# Patient Record
Sex: FEMALE | Race: AMERICAN INDIAN OR ALASKA NATIVE | NOT HISPANIC OR LATINO | URBAN - METROPOLITAN AREA
[De-identification: names, ages, dates, MRNs, and addresses within clinical notes are randomized per-mention and may not be internally consistent; named-entity substitution may affect disease eponyms.]

---

## 2021-09-04 ENCOUNTER — INPATIENT (INPATIENT)
Facility: HOSPITAL | Age: 21
LOS: 0 days | Discharge: HOME | End: 2021-09-05
Attending: SURGERY | Admitting: SURGERY
Payer: COMMERCIAL

## 2021-09-04 VITALS
RESPIRATION RATE: 17 BRPM | TEMPERATURE: 97 F | HEART RATE: 90 BPM | SYSTOLIC BLOOD PRESSURE: 125 MMHG | DIASTOLIC BLOOD PRESSURE: 64 MMHG | WEIGHT: 210.1 LBS | OXYGEN SATURATION: 98 % | HEIGHT: 61 IN

## 2021-09-04 PROCEDURE — 93010 ELECTROCARDIOGRAM REPORT: CPT

## 2021-09-04 PROCEDURE — 99285 EMERGENCY DEPT VISIT HI MDM: CPT

## 2021-09-04 RX ORDER — FAMOTIDINE 10 MG/ML
20 INJECTION INTRAVENOUS ONCE
Refills: 0 | Status: COMPLETED | OUTPATIENT
Start: 2021-09-04 | End: 2021-09-04

## 2021-09-04 RX ORDER — KETOROLAC TROMETHAMINE 30 MG/ML
30 SYRINGE (ML) INJECTION ONCE
Refills: 0 | Status: DISCONTINUED | OUTPATIENT
Start: 2021-09-04 | End: 2021-09-04

## 2021-09-04 RX ADMIN — Medication 30 MILLILITER(S): at 23:30

## 2021-09-04 RX ADMIN — Medication 30 MILLIGRAM(S): at 22:19

## 2021-09-04 RX ADMIN — FAMOTIDINE 20 MILLIGRAM(S): 10 INJECTION INTRAVENOUS at 23:27

## 2021-09-04 NOTE — ED ADULT NURSE NOTE - OBJECTIVE STATEMENT
Pt presented in ED c/o chest discomfort and abdominal pain x 1 day.  Denies n/v/D, fever, chill, SOB. No acute distress noted

## 2021-09-04 NOTE — ED ADULT TRIAGE NOTE - CHIEF COMPLAINT QUOTE
Patient presents to ED with complaints of chest pain starting last night associated with shortness of breath and abdominal pain starting today.

## 2021-09-05 VITALS
SYSTOLIC BLOOD PRESSURE: 116 MMHG | TEMPERATURE: 97 F | OXYGEN SATURATION: 99 % | DIASTOLIC BLOOD PRESSURE: 60 MMHG | RESPIRATION RATE: 18 BRPM | HEART RATE: 78 BPM

## 2021-09-05 LAB
ALBUMIN SERPL ELPH-MCNC: 4.4 G/DL — SIGNIFICANT CHANGE UP (ref 3.5–5.2)
ALBUMIN SERPL ELPH-MCNC: 4.4 G/DL — SIGNIFICANT CHANGE UP (ref 3.5–5.2)
ALP SERPL-CCNC: 183 U/L — HIGH (ref 30–115)
ALP SERPL-CCNC: 187 U/L — HIGH (ref 30–115)
ALT FLD-CCNC: 396 U/L — HIGH (ref 0–41)
ALT FLD-CCNC: 401 U/L — HIGH (ref 0–41)
ANION GAP SERPL CALC-SCNC: 10 MMOL/L — SIGNIFICANT CHANGE UP (ref 7–14)
AST SERPL-CCNC: 561 U/L — HIGH (ref 0–41)
AST SERPL-CCNC: 562 U/L — HIGH (ref 0–41)
BASOPHILS # BLD AUTO: 0.05 K/UL — SIGNIFICANT CHANGE UP (ref 0–0.2)
BASOPHILS NFR BLD AUTO: 0.7 % — SIGNIFICANT CHANGE UP (ref 0–1)
BILIRUB DIRECT SERPL-MCNC: 1.7 MG/DL — HIGH (ref 0–0.2)
BILIRUB INDIRECT FLD-MCNC: 0.5 MG/DL — SIGNIFICANT CHANGE UP (ref 0.2–1.2)
BILIRUB SERPL-MCNC: 2.2 MG/DL — HIGH (ref 0.2–1.2)
BILIRUB SERPL-MCNC: 2.3 MG/DL — HIGH (ref 0.2–1.2)
BUN SERPL-MCNC: 13 MG/DL — SIGNIFICANT CHANGE UP (ref 10–20)
CALCIUM SERPL-MCNC: 9.5 MG/DL — SIGNIFICANT CHANGE UP (ref 8.5–10.1)
CHLORIDE SERPL-SCNC: 102 MMOL/L — SIGNIFICANT CHANGE UP (ref 98–110)
CO2 SERPL-SCNC: 26 MMOL/L — SIGNIFICANT CHANGE UP (ref 17–32)
CREAT SERPL-MCNC: 0.7 MG/DL — SIGNIFICANT CHANGE UP (ref 0.7–1.5)
EOSINOPHIL # BLD AUTO: 0.19 K/UL — SIGNIFICANT CHANGE UP (ref 0–0.7)
EOSINOPHIL NFR BLD AUTO: 2.5 % — SIGNIFICANT CHANGE UP (ref 0–8)
GLUCOSE SERPL-MCNC: 105 MG/DL — HIGH (ref 70–99)
HCG SERPL QL: NEGATIVE — SIGNIFICANT CHANGE UP
HCT VFR BLD CALC: 41.4 % — SIGNIFICANT CHANGE UP (ref 37–47)
HGB BLD-MCNC: 13.4 G/DL — SIGNIFICANT CHANGE UP (ref 12–16)
IMM GRANULOCYTES NFR BLD AUTO: 0.3 % — SIGNIFICANT CHANGE UP (ref 0.1–0.3)
LIDOCAIN IGE QN: 56 U/L — SIGNIFICANT CHANGE UP (ref 7–60)
LYMPHOCYTES # BLD AUTO: 1.95 K/UL — SIGNIFICANT CHANGE UP (ref 1.2–3.4)
LYMPHOCYTES # BLD AUTO: 25.5 % — SIGNIFICANT CHANGE UP (ref 20.5–51.1)
MCHC RBC-ENTMCNC: 27.1 PG — SIGNIFICANT CHANGE UP (ref 27–31)
MCHC RBC-ENTMCNC: 32.4 G/DL — SIGNIFICANT CHANGE UP (ref 32–37)
MCV RBC AUTO: 83.8 FL — SIGNIFICANT CHANGE UP (ref 81–99)
MONOCYTES # BLD AUTO: 0.69 K/UL — HIGH (ref 0.1–0.6)
MONOCYTES NFR BLD AUTO: 9 % — SIGNIFICANT CHANGE UP (ref 1.7–9.3)
NEUTROPHILS # BLD AUTO: 4.76 K/UL — SIGNIFICANT CHANGE UP (ref 1.4–6.5)
NEUTROPHILS NFR BLD AUTO: 62 % — SIGNIFICANT CHANGE UP (ref 42.2–75.2)
NRBC # BLD: 0 /100 WBCS — SIGNIFICANT CHANGE UP (ref 0–0)
PLATELET # BLD AUTO: 431 K/UL — HIGH (ref 130–400)
POTASSIUM SERPL-MCNC: 4.5 MMOL/L — SIGNIFICANT CHANGE UP (ref 3.5–5)
POTASSIUM SERPL-SCNC: 4.5 MMOL/L — SIGNIFICANT CHANGE UP (ref 3.5–5)
PROT SERPL-MCNC: 7.8 G/DL — SIGNIFICANT CHANGE UP (ref 6–8)
PROT SERPL-MCNC: 7.9 G/DL — SIGNIFICANT CHANGE UP (ref 6–8)
RBC # BLD: 4.94 M/UL — SIGNIFICANT CHANGE UP (ref 4.2–5.4)
RBC # FLD: 13.4 % — SIGNIFICANT CHANGE UP (ref 11.5–14.5)
SARS-COV-2 RNA SPEC QL NAA+PROBE: SIGNIFICANT CHANGE UP
SODIUM SERPL-SCNC: 138 MMOL/L — SIGNIFICANT CHANGE UP (ref 135–146)
TROPONIN T SERPL-MCNC: <0.01 NG/ML — SIGNIFICANT CHANGE UP
WBC # BLD: 7.66 K/UL — SIGNIFICANT CHANGE UP (ref 4.8–10.8)
WBC # FLD AUTO: 7.66 K/UL — SIGNIFICANT CHANGE UP (ref 4.8–10.8)

## 2021-09-05 PROCEDURE — 71046 X-RAY EXAM CHEST 2 VIEWS: CPT | Mod: 26

## 2021-09-05 PROCEDURE — 99223 1ST HOSP IP/OBS HIGH 75: CPT

## 2021-09-05 PROCEDURE — 99222 1ST HOSP IP/OBS MODERATE 55: CPT

## 2021-09-05 PROCEDURE — 74181 MRI ABDOMEN W/O CONTRAST: CPT | Mod: 26

## 2021-09-05 PROCEDURE — 74177 CT ABD & PELVIS W/CONTRAST: CPT | Mod: 26,MA

## 2021-09-05 PROCEDURE — 76705 ECHO EXAM OF ABDOMEN: CPT | Mod: 26

## 2021-09-05 RX ORDER — OXYCODONE HYDROCHLORIDE 5 MG/1
5 TABLET ORAL EVERY 6 HOURS
Refills: 0 | Status: DISCONTINUED | OUTPATIENT
Start: 2021-09-05 | End: 2021-09-05

## 2021-09-05 RX ORDER — SODIUM CHLORIDE 9 MG/ML
1000 INJECTION, SOLUTION INTRAVENOUS
Refills: 0 | Status: DISCONTINUED | OUTPATIENT
Start: 2021-09-05 | End: 2021-09-05

## 2021-09-05 RX ORDER — ACETAMINOPHEN 500 MG
650 TABLET ORAL EVERY 6 HOURS
Refills: 0 | Status: DISCONTINUED | OUTPATIENT
Start: 2021-09-05 | End: 2021-09-05

## 2021-09-05 RX ORDER — ENOXAPARIN SODIUM 100 MG/ML
40 INJECTION SUBCUTANEOUS EVERY 24 HOURS
Refills: 0 | Status: DISCONTINUED | OUTPATIENT
Start: 2021-09-05 | End: 2021-09-05

## 2021-09-05 RX ORDER — IBUPROFEN 200 MG
400 TABLET ORAL EVERY 6 HOURS
Refills: 0 | Status: DISCONTINUED | OUTPATIENT
Start: 2021-09-05 | End: 2021-09-05

## 2021-09-05 RX ORDER — PANTOPRAZOLE SODIUM 20 MG/1
40 TABLET, DELAYED RELEASE ORAL
Refills: 0 | Status: DISCONTINUED | OUTPATIENT
Start: 2021-09-05 | End: 2021-09-05

## 2021-09-05 RX ORDER — IBUPROFEN 200 MG
1 TABLET ORAL
Qty: 0 | Refills: 0 | DISCHARGE
Start: 2021-09-05

## 2021-09-05 RX ORDER — CHLORHEXIDINE GLUCONATE 213 G/1000ML
1 SOLUTION TOPICAL
Refills: 0 | Status: DISCONTINUED | OUTPATIENT
Start: 2021-09-05 | End: 2021-09-05

## 2021-09-05 RX ORDER — ACETAMINOPHEN 500 MG
2 TABLET ORAL
Qty: 0 | Refills: 0 | DISCHARGE
Start: 2021-09-05

## 2021-09-05 RX ADMIN — PANTOPRAZOLE SODIUM 40 MILLIGRAM(S): 20 TABLET, DELAYED RELEASE ORAL at 06:47

## 2021-09-05 RX ADMIN — SODIUM CHLORIDE 125 MILLILITER(S): 9 INJECTION, SOLUTION INTRAVENOUS at 06:12

## 2021-09-05 NOTE — CONSULT NOTE ADULT - SUBJECTIVE AND OBJECTIVE BOX
Gastroenterology Consultation:    Patient is a 20y old  Female who presents with a chief complaint of r/o choledocholithiasis (05 Sep 2021 05:43)        Admitted on: 09-05-21      HPI:      This is a 20 year old female with no significant PMH/PSH who presented to the hospital with abdominal pain since Friday evening. States that pain is sharp, nonradiating, and worsened over the last day. Reports associated nausea and 1 episode of NBNB emesis. Denies similar previous episodes. Denies fever, chills, or changes in bowel movements or urination. (05 Sep 2021 05:43)        Prior EGD: none    Prior Colonoscopy: none      PAST MEDICAL & SURGICAL HISTORY:  No pertinent past medical history    No significant past surgical history          FAMILY HISTORY:      Social History:  Tobacco: denies  Alcohol: denies  Drugs: denies    Home Medications:        MEDICATIONS  (STANDING):  chlorhexidine 4% Liquid 1 Application(s) Topical <User Schedule>  enoxaparin Injectable 40 milliGRAM(s) SubCutaneous every 24 hours  lactated ringers. 1000 milliLiter(s) (125 mL/Hr) IV Continuous <Continuous>  pantoprazole    Tablet 40 milliGRAM(s) Oral before breakfast    MEDICATIONS  (PRN):  acetaminophen   Tablet .. 650 milliGRAM(s) Oral every 6 hours PRN Temp greater or equal to 38C (100.4F), Mild Pain (1 - 3)  ibuprofen  Tablet. 400 milliGRAM(s) Oral every 6 hours PRN Moderate Pain (4 - 6)  oxyCODONE    IR 5 milliGRAM(s) Oral every 6 hours PRN Severe Pain (7 - 10)      Allergies  No Known Drug Allergies  shellfish (Anaphylaxis)      Review of Systems:   Constitutional:  No Fever, No Chills  ENT/Mouth:  No Hearing Changes,  No Difficulty Swallowing  Eyes:  No Eye Pain, No Vision Changes  Cardiovascular:  No Chest Pain, No Palpitations  Respiratory:  No Cough, No Dyspnea  Gastrointestinal:  As described in HPI  Musculoskeletal:  No Joint Swelling, No Back Pain  Skin:  No Skin Lesions, No Jaundice  Neuro:  No Syncope, No Dizziness  Heme/Lymph:  No Bruising, No Bleeding.          Physical Examination:  T(C): 36.3 (09-05-21 @ 06:41), Max: 36.3 (09-05-21 @ 06:41)  HR: 78 (09-05-21 @ 06:41) (78 - 90)  BP: 109/79 (09-05-21 @ 06:41) (109/79 - 125/64)  RR: 18 (09-05-21 @ 06:41) (17 - 18)  SpO2: 99% (09-05-21 @ 06:41) (98% - 99%)  Height (cm): 154.9 (09-04-21 @ 21:26)  Weight (kg): 95.3 (09-04-21 @ 21:26)        GENERAL:  Appears stated age, well-groomed, well-nourished, no distress  HEENT:  NC/AT,  conjunctivae clear and pink, no thyromegaly, nodules, adenopathy, sclera -anicteric  CHEST:  Full & symmetric excursion, no increased effort, breath sounds clear  HEART:  Regular rhythm, S1, S2, no murmur/rub/S3/S4, no abdominal bruit, no edema  ABDOMEN:  Soft, non-tender, non-distended, normoactive bowel sounds,  no masses ,no hepato-splenomegaly, no signs of chronic liver disease  EXTEREMITIES:  no cyanosis,clubbing or edema  SKIN:  No rash/erythema/ecchymoses/petechiae/wounds/abscess/warm/dry  NEURO:  Alert, oriented, no asterixis, no tremor, no encephalopathy          Data:                        13.4   7.66  )-----------( 431      ( 05 Sep 2021 00:04 )             41.4     Hgb Trend:  13.4  09-05-21 @ 00:04      09-05    138  |  102  |  13  ----------------------------<  105<H>  4.5   |  26  |  0.7    Ca    9.5      05 Sep 2021 00:04    TPro  7.8  /  Alb  4.4  /  TBili  2.2<H>  /  DBili  1.7<H>  /  AST  562<H>  /  ALT  401<H>  /  AlkPhos  183<H>  09-05    Liver panel trend:  TBili 2.2   /      /      /   AlkP 183   /   Tptn 7.8   /   Alb 4.4    /   DBili 1.7      09-05  TBili 2.3   /      /      /   AlkP 187   /   Tptn 7.9   /   Alb 4.4    /   DBili --      09-05              Radiology:  CT Abdomen and Pelvis w/ IV Cont:   EXAM:  CT ABDOMEN AND PELVIS IC            PROCEDURE DATE:  09/05/2021            INTERPRETATION:  CLINICAL STATEMENT: Elevated LFTs    TECHNIQUE: Contiguous axial CT images were obtained from the lower chest to the pubic symphysis following administration of 100cc Optiray 320 intravenous contrast.  Oral contrast was not administered.  Reformatted images in the coronal and sagittal planes were acquired.    COMPARISON/CORRELATION: Right upper quadrant ultrasound 9/5/2020.      FINDINGS:    LOWERCHEST: Unremarkable.    HEPATOBILIARY: Calcified stone are noted in the right upper quadrant likely within the cystic duct or proximal common bile duct. No cholelithiasis. No intra or extrahepatic biliary ductal dilatation.    SPLEEN: Unremarkable.    PANCREAS: Unremarkable.    ADRENAL GLANDS: Unremarkable.    KIDNEYS: Symmetric renal enhancement. No hydronephrosis.    ABDOMINOPELVIC NODES: Unremarkable.    PELVIC ORGANS: Unremarkable.    PERITONEUM/MESENTERY/BOWEL: No evidence of bowel obstruction, pneumoperitoneum or ascites. Appendix is unremarkable.    BONES/SOFT TISSUES: Unremarkable.      IMPRESSION:    Stones in the right upper quadrant, likely within the cystic duct or proximal common bile duct. MRCP may be obtained for further evaluation.    --- End of Report ---            ALEX HAND MD; Resident Radiologist  This document has been electronically signed.  GOYO GILBERT MD; Attending Radiologist  This document has been electronically signed. Sep  5 2021  6:37AM (09-05-21 @ 05:06)    US Abdomen Upper Quadrant Right:   EXAM:  US ABDOMEN RT UPR QUADRANT            PROCEDURE DATE:  09/05/2021            INTERPRETATION:  CLINICAL INFORMATION: Abdominal pain, elevated liver enzymes    COMPARISON: None available.    TECHNIQUE: Sonography of the right upper quadrant.    FINDINGS:    Liver: Within normal limits.  Bile ducts: Normal caliber. Common bile duct measures 0.5 cm.  Gallbladder: Within normal limits.  Pancreas: Obscured  Right kidney: 9.5 cm. No hydronephrosis.  Ascites: None.  IVC: Visualized portions are within normal limits.    IMPRESSION:    No evidence of cholelithiasis or cholecystitis.     Unremarkable sonographic appearance of the liver.        --- End of Report ---              GOYO GILBERT MD; Attending Radiologist  This document has been electronically signed. Sep  5 2021  3:39AM (09-05-21 @ 03:30)

## 2021-09-05 NOTE — DISCHARGE NOTE NURSING/CASE MANAGEMENT/SOCIAL WORK - PATIENT PORTAL LINK FT
You can access the FollowMyHealth Patient Portal offered by [facility] by registering at the following website: [email or web address]. By joining [employer]’s FollowMyHealth portal, you will also be able to view your health information using other applications (apps) compatible with our system.

## 2021-09-05 NOTE — ED ADULT NURSE REASSESSMENT NOTE - NS ED NURSE REASSESS COMMENT FT1
Pt reassessed A/O times 4 Vs stable  report no pain no SOB no N/V no dizziness ambulate steady ,pt is seen evaluate by surgery team  clear to go  home ready to be d/C with family members NAD noted .

## 2021-09-05 NOTE — ED PROVIDER NOTE - NS ED ROS FT
Constitutional: no fever, chills, no recent weight loss, change in appetite or malaise  Eyes: no redness/discharge/pain/vision changes  ENT: no rhinorrhea/ear pain/sore throat  Cardiac: No SOB or edema.  Respiratory: No cough or respiratory distress  GI: No vomiting, diarrhea   : No dysuria, frequency, urgency or hematuria  MS: no pain to back or extremities, no loss of ROM, no weakness  Neuro: No headache or weakness. No LOC.  Skin: No skin rash.  Endocrine: No history of thyroid disease or diabetes.  Except as documented in the HPI, all other systems are negative.

## 2021-09-05 NOTE — DISCHARGE NOTE NURSING/CASE MANAGEMENT/SOCIAL WORK - NSDCPEFALRISK_GEN_ALL_CORE
For information on Fall & injury Prevention, visit [email or web address]

## 2021-09-05 NOTE — H&P ADULT - NSHPLABSRESULTS_GEN_ALL_CORE
LABS:             13.4   7.66  )-----------( 431      ( 09-05 @ 00:04 )             41.4                138   |  102   |  13                 Ca: 9.5    BMP:   ----------------------------< 105    Mg: x     (09-05-21 @ 00:04)             4.5    |  26    | 0.7                Ph: x        LFT:     TPro: 7.9 / Alb: 4.4 / TBili: 2.3 / DBili: x / AST: 561 / ALT: 396 / AlkPhos: 187   (09-05-21 @ 00:04)    CARDIAC MARKERS ( 05 Sep 2021 00:46 )  x     / <0.01 ng/mL / x     / x     / x          RADIOLOGY & OTHER STUDIES:  < from: US Abdomen Upper Quadrant Right (09.05.21 @ 03:30) >  IMPRESSION:    No evidence of cholelithiasis or cholecystitis.  Unremarkable sonographic appearance of the liver.  < end of copied text >    < from: CT Abdomen and Pelvis w/ IV Cont (09.05.21 @ 05:06) >  IMPRESSION:    Stones in the right upper quadrant, likely within the cystic duct or proximal common bile duct. MRCP may be obtained for further evaluation.  ******PRELIMINARY REPORT******    < end of copied text >

## 2021-09-05 NOTE — CONSULT NOTE ADULT - ASSESSMENT
This is a 20 year old female with no significant PMH/PSH who presented to the hospital with abdominal pain since Friday evening. States that pain is sharp, nonradiating, and worsened over the last day. Reports associated nausea and 1 episode of NBNB emesis. Denies similar previous episodes. Denies fever, chills, or changes in bowel movements or urination.     #Cholelithiasis - r/o choledocho - Intermediate probability  - no evidence of sepsis  - LFTS: 2.2/183/562/401  - CTAP: stone in GB and cystic duct. CBD 5mm on RUq sono    Rec  - please obtain MRCP  - trend lfts qdaily  - monitor for signs of sepsis and initiate abx if indicated  - sx on board for possible CCY

## 2021-09-05 NOTE — ED PROVIDER NOTE - ATTENDING CONTRIBUTION TO CARE
21-year-old female no past medical history presents with abdominal pain chest pain worsening for 1 day.  Patient noted that she had this for a week and was vaccinated against COVID-19.  Pain is sharp nonradiating moderate 5 out of 10 no modifying factors.  No fevers chills no urinary symptoms no nausea vomiting diarrhea bloody stool.  Well appearing, NAD, non toxic. NCAT PERRLA EOMI neck supple non tender normal wob cta bl rrr abdomen s nt diffuse tenderness.  No rebound no guarding WWPx4 neuro non focal.  Plan labs EKG CAT scan

## 2021-09-05 NOTE — DISCHARGE NOTE PROVIDER - CARE PROVIDER_API CALL
Cosme Vickers)  Surgery; Surgical Critical Care  [address] [place] [address], [address] [place] [address]  Phone: [phone]  Fax: [phone]  Follow Up Time: 1-3 days

## 2021-09-05 NOTE — H&P ADULT - ASSESSMENT
This is a 20 year old female with no significant PMH/PSH who presented to the hospital with abdominal pain since Friday evening. Patient afebrile on arrival, WBC wnl, tender to palpation over epigastrum > RUQ. Labs and imaging concerning for choledocholithiasis.    PLAN:  -Admit to surgery  -F/u GI consult  -MRCP  -NPO, IVFs  -Pain control  -IS  -OOBAT  -DVT/GI ppx

## 2021-09-05 NOTE — DISCHARGE NOTE PROVIDER - NSDCMRMEDTOKEN_GEN_ALL_CORE_FT
acetaminophen 325 mg oral tablet: 2 tab(s) orally every 6 hours, As needed, Temp greater or equal to 38C (100.4F), Mild Pain (1 - 3)  ibuprofen 400 mg oral tablet: 1 tab(s) orally every 6 hours, As needed, Moderate Pain (4 - 6)

## 2021-09-05 NOTE — ED PROVIDER NOTE - OBJECTIVE STATEMENT
pt poor historian, sts on metformin for weight loss- presents to ED c/o abd pain, nausea, chest pain for 1 week. vaccinated against covid19. pain is sharp, nonradiating, moderate. denies exacerbating or relieving factors. Denies fever/chill/HA/dizziness/palpitation/sob/v/d/ black stool/bloody stool/urinary sxs

## 2021-09-05 NOTE — CONSULT NOTE ADULT - ATTENDING COMMENTS
21 yo  F admitted for RUQ work up. Cholelithiasis in the cystic duct noted on MRCP. Biliary colic no suggestion of acute cholecystitis on imaging. Symptoms improved. No indication for ERCP at the moment. Lap CCY as per surgery

## 2021-09-05 NOTE — DISCHARGE NOTE PROVIDER - HOSPITAL COURSE
20 year old female with no significant PMH/PSH who presented to the hospital with abdominal pain since Friday evening. States that pain is sharp, nonradiating, and worsened over the last day. Reports associated nausea and 1 episode of NBNB emesis. Denies similar previous episodes. Denies fever, chills, or changes in bowel movements or urination. Work up revealed elevated liver function tests and gallstones in the gallbladder. GI saw the patient, recommended MRCP that was done and negative. The patient feels better and does not want to stay for planned surgery on 9/8 and will be discharged with instructions to follow up with Dr. Vickers within the next couple of days to schedule a laparoscopic cholecystectomy as an outpatient. The patient is aware that the laparoscopic cholecystectomy should be performed during admission, but would like to be discharged with close outpatient followup. The patient and her mother were educated on what signs and symptoms to look out for to return to ED if necessary. The patient was advised to stay overnight to repeat LFT's because the last levels were elevated, patient and mother opted to be discharged today.

## 2021-09-05 NOTE — DISCHARGE NOTE PROVIDER - NSDCCPCAREPLAN_GEN_ALL_CORE_FT
PRINCIPAL DISCHARGE DIAGNOSIS  Diagnosis: Gallstones  Assessment and Plan of Treatment: Please continue a low fat diet  Take tylenol and/or ibuprofen as needed for pain.  Please call Dr. Vickers's office on Tuesday to schedule an appointment to book an outpatient laparoscopic cholecystectomy. [phone]  Please call Dr. Vickers's office or return to ED if you are unable to tolerate diet, develop severe RUQ pain, spike a fever or your symptoms worsen.  Your last measured T bili/D bili levels were elevated, but MRCP showed no stone in the duct.

## 2021-09-05 NOTE — H&P ADULT - NSHPPHYSICALEXAM_GEN_ALL_CORE
ICU Vital Signs Last 24 Hrs  T(C): 36.2 (04 Sep 2021 21:26), Max: 36.2 (04 Sep 2021 21:26)  T(F): 97.1 (04 Sep 2021 21:26), Max: 97.1 (04 Sep 2021 21:26)  HR: 90 (04 Sep 2021 21:26) (90 - 90)  BP: 125/64 (04 Sep 2021 21:26) (125/64 - 125/64)  RR: 17 (04 Sep 2021 21:26) (17 - 17)  SpO2: 98% (04 Sep 2021 21:26) (98% - 98%)    VITALS:  T(F): 97.1 (09-04-21 @ 21:26), Max: 97.1 (09-04-21 @ 21:26)  HR: 90 (09-04-21 @ 21:26) (90 - 90)  BP: 125/64 (09-04-21 @ 21:26) (125/64 - 125/64)  RR: 17 (09-04-21 @ 21:26) (17 - 17)  SpO2: 98% (09-04-21 @ 21:26) (98% - 98%)    PHYSICAL EXAM:  General: NAD, AAOx3, calm and cooperative  Cardiac: RRR S1, S2, no Murmurs, rubs or gallops  Respiratory: CTAB, normal respiratory effort  Abdomen: Soft, obese abdomen. Mildly tender to palpation over epigastrum > RUQ. No rebound or guarding.  Skin: Warm/dry, normal color, no jaundice

## 2021-09-05 NOTE — H&P ADULT - HISTORY OF PRESENT ILLNESS
This is a 20 year old female with no significant PMH/PSH who presented to the hospital with abdominal pain since Friday evening. States that pain is sharp, nonradiating, and worsened over the last day. Reports associated nausea and 1 episode of NBNB emesis. Denies similar previous episodes. Denies fever, chills, or changes in bowel movements or urination.

## 2021-09-09 DIAGNOSIS — Z91.013 ALLERGY TO SEAFOOD: ICD-10-CM

## 2021-09-09 DIAGNOSIS — K80.20 CALCULUS OF GALLBLADDER WITHOUT CHOLECYSTITIS WITHOUT OBSTRUCTION: ICD-10-CM

## 2021-09-14 PROBLEM — Z00.00 ENCOUNTER FOR PREVENTIVE HEALTH EXAMINATION: Status: ACTIVE | Noted: 2021-09-14

## 2022-02-27 ENCOUNTER — EMERGENCY (EMERGENCY)
Facility: HOSPITAL | Age: 22
LOS: 0 days | Discharge: HOME | End: 2022-02-27
Attending: EMERGENCY MEDICINE | Admitting: EMERGENCY MEDICINE
Payer: COMMERCIAL

## 2022-02-27 VITALS
RESPIRATION RATE: 18 BRPM | HEART RATE: 76 BPM | TEMPERATURE: 98 F | OXYGEN SATURATION: 98 % | SYSTOLIC BLOOD PRESSURE: 121 MMHG | DIASTOLIC BLOOD PRESSURE: 71 MMHG

## 2022-02-27 VITALS
OXYGEN SATURATION: 98 % | HEART RATE: 117 BPM | TEMPERATURE: 98 F | DIASTOLIC BLOOD PRESSURE: 58 MMHG | SYSTOLIC BLOOD PRESSURE: 124 MMHG | HEIGHT: 61 IN | RESPIRATION RATE: 22 BRPM

## 2022-02-27 DIAGNOSIS — R07.0 PAIN IN THROAT: ICD-10-CM

## 2022-02-27 DIAGNOSIS — R11.10 VOMITING, UNSPECIFIED: ICD-10-CM
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
## 2022-02-27 DIAGNOSIS — R11.2 NAUSEA WITH VOMITING, UNSPECIFIED: ICD-10-CM
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
## 2022-02-27 DIAGNOSIS — Z91.013 ALLERGY TO SEAFOOD: ICD-10-CM

## 2022-02-27 DIAGNOSIS — T78.04XA ANAPHYLACTIC REACTION DUE TO FRUITS AND VEGETABLES, INITIAL ENCOUNTER: ICD-10-CM

## 2022-02-27 PROCEDURE — 99291 CRITICAL CARE FIRST HOUR: CPT

## 2022-02-27 RX ORDER — EPINEPHRINE 0.3 MG/.3ML
0.3 INJECTION INTRAMUSCULAR; SUBCUTANEOUS
Qty: 1 | Refills: 1
Start: 2022-02-27 | End: 2022-02-28

## 2022-02-27 RX ORDER — FAMOTIDINE 10 MG/ML
20 INJECTION INTRAVENOUS DAILY
Refills: 0 | Status: DISCONTINUED | OUTPATIENT
Start: 2022-02-27 | End: 2022-02-27

## 2022-02-27 RX ORDER — DEXAMETHASONE 0.5 MG/5ML
10 ELIXIR ORAL ONCE
Refills: 0 | Status: COMPLETED | OUTPATIENT
Start: 2022-02-27 | End: 2022-02-27

## 2022-02-27 RX ORDER — SODIUM CHLORIDE 9 MG/ML
1000 INJECTION INTRAMUSCULAR; INTRAVENOUS; SUBCUTANEOUS ONCE
Refills: 0 | Status: COMPLETED | OUTPATIENT
Start: 2022-02-27 | End: 2022-02-27

## 2022-02-27 RX ORDER — ACETAMINOPHEN 500 MG
975 TABLET ORAL ONCE
Refills: 0 | Status: DISCONTINUED | OUTPATIENT
Start: 2022-02-27 | End: 2022-02-27

## 2022-02-27 RX ADMIN — Medication 10 MILLIGRAM(S): at 18:27

## 2022-02-27 RX ADMIN — FAMOTIDINE 104 MILLIGRAM(S): 10 INJECTION INTRAVENOUS at 18:27

## 2022-02-27 RX ADMIN — SODIUM CHLORIDE 1000 MILLILITER(S): 9 INJECTION INTRAMUSCULAR; INTRAVENOUS; SUBCUTANEOUS at 18:26

## 2022-02-27 NOTE — ED PROVIDER NOTE - OBJECTIVE STATEMENT
The pt is a 21y F w/ hx of multiple allergies presenting with N/V and throat discomfort after eating kiwi earlier today. She took Benadryl 50 mg PO and EpiPen at 17:40 and feels improved in the ED. Denies fever, headache, chest pain, SOB, abdominal pain.

## 2022-02-27 NOTE — ED PROVIDER NOTE - PATIENT PORTAL LINK FT
You can access the FollowMyHealth Patient Portal offered by [facility] by registering at the following website: [email or web address]. By joining [employer]’s FollowMyHealth portal, you will also be able to view your health information using other applications (apps) compatible with our system.

## 2022-02-27 NOTE — ED PROVIDER NOTE - CARE PROVIDER_API CALL
Oneyda Bethea  Allergy & Immunology  [facility] [place] [address]  Phone: [phone]  Fax: [phone]  Follow Up Time: 1-3 Days

## 2022-02-27 NOTE — ED PROVIDER NOTE - NS ED ROS FT
Constitutional: (-) fever  Eyes/ENT: (-) blurry vision, (-) epistaxis, (+) throat discomfort  Cardiovascular: (-) chest pain, (-) syncope  Respiratory: (-) cough, (-) shortness of breath  Gastrointestinal: (+) vomiting, (-) diarrhea  Musculoskeletal: (-) neck pain, (-) back pain, (-) joint pain  Integumentary: (-) rash, (-) edema  Neurological: (-) headache, (-) altered mental status  Psychiatric: (-) hallucinations  Allergic/Immunologic: (-) pruritus

## 2022-02-27 NOTE — ED ADULT NURSE NOTE - NSIMPLEMENTINTERV_GEN_ALL_ED
Implemented All Universal Safety Interventions:  [place] to call system. Call bell, personal items and telephone within reach. Instruct patient to call for assistance. Room bathroom lighting operational. Non-slip footwear when patient is off stretcher. Physically safe environment: no spills, clutter or unnecessary equipment. Stretcher in lowest position, wheels locked, appropriate side rails in place.

## 2022-02-27 NOTE — ED PROVIDER NOTE - ATTENDING CONTRIBUTION TO CARE
21 y.o. female, PMH of multiple allergies, had kiwi earlier today and immediately developed n/v/d and throat discomfort. Took Benadryl 50mg PO and EpiPen at 5:40pm. Feels better now. Denies fevers, chills, abdominal pain, urinary symptoms, chest pain, palpitations, shortness of breath, dyspnea on exertion, syncope/near syncope, cough/URI symptoms, headache, weakness, numbness, focal deficits, visual changes, gait or balance changes, dizziness. On exam, pt in NAD, AAOx3, head NC/AT, CN II-XII intact, throat (-) erythema/swelling, tongue normal size, lungs CTA B/L, CV S1S2 regular, abdomen soft/NT/ND/(+)BS, ext (-) edema, motor 5/5x4, sensation intact. Will give Decadron, pepcid, IVF and observe.

## 2022-02-27 NOTE — ED ADULT TRIAGE NOTE - CHIEF COMPLAINT QUOTE
pt BIBA from home s/p eating a kiwi had allergy as kid but was unaware , pt was given 50 PO Benadryl and 1 dose of epi by mom before EMS; pt raspy in triage and feels nauseous

## 2022-02-27 NOTE — ED PROVIDER NOTE - CLINICAL SUMMARY MEDICAL DECISION MAKING FREE TEXT BOX
21 y.o. female, PMH of multiple allergies, had kiwi earlier today and immediately developed n/v/d and throat discomfort. Took Benadryl 50mg PO and EpiPen at 5:40pm. Feels better now. Denies fevers, chills, abdominal pain, urinary symptoms, chest pain, palpitations, shortness of breath, dyspnea on exertion, syncope/near syncope, cough/URI symptoms, headache, weakness, numbness, focal deficits, visual changes, gait or balance changes, dizziness. On exam, pt in NAD, AAOx3, head NC/AT, CN II-XII intact, throat (-) erythema/swelling, tongue normal size, lungs CTA B/L, CV S1S2 regular, abdomen soft/NT/ND/(+)BS, ext (-) edema, motor 5/5x4, sensation intact. Pt observed in the ED. Symptoms resolved. Will d/c. EpiPen and Steroids sent to the pharmacy.

## 2022-02-28 PROBLEM — Z78.9 OTHER SPECIFIED HEALTH STATUS: Chronic | Status: ACTIVE | Noted: 2021-09-05
